# Patient Record
Sex: FEMALE | Race: WHITE | Employment: FULL TIME | URBAN - METROPOLITAN AREA
[De-identification: names, ages, dates, MRNs, and addresses within clinical notes are randomized per-mention and may not be internally consistent; named-entity substitution may affect disease eponyms.]

---

## 2024-10-07 ENCOUNTER — HOSPITAL ENCOUNTER (EMERGENCY)
Facility: CLINIC | Age: 24
Discharge: HOME | End: 2024-10-07
Attending: EMERGENCY MEDICINE
Payer: COMMERCIAL

## 2024-10-07 VITALS
HEIGHT: 67 IN | HEART RATE: 89 BPM | TEMPERATURE: 97.8 F | OXYGEN SATURATION: 97 % | WEIGHT: 200 LBS | RESPIRATION RATE: 18 BRPM | SYSTOLIC BLOOD PRESSURE: 139 MMHG | DIASTOLIC BLOOD PRESSURE: 91 MMHG | BODY MASS INDEX: 31.39 KG/M2

## 2024-10-07 DIAGNOSIS — L50.9 URTICARIA: Primary | ICD-10-CM

## 2024-10-07 PROCEDURE — 99283 EMERGENCY DEPT VISIT LOW MDM: CPT

## 2024-10-07 PROCEDURE — 6370000000 HC RX 637 (ALT 250 FOR IP): Performed by: NURSE PRACTITIONER

## 2024-10-07 RX ORDER — PREDNISONE 20 MG/1
TABLET ORAL
Qty: 15 TABLET | Refills: 0 | Status: SHIPPED | OUTPATIENT
Start: 2024-10-08 | End: 2024-10-18

## 2024-10-07 RX ORDER — PREDNISONE 20 MG/1
60 TABLET ORAL
Status: COMPLETED | OUTPATIENT
Start: 2024-10-07 | End: 2024-10-07

## 2024-10-07 RX ADMIN — PREDNISONE 60 MG: 20 TABLET ORAL at 13:22

## 2024-10-07 ASSESSMENT — ENCOUNTER SYMPTOMS
BACK PAIN: 0
VOMITING: 0
SHORTNESS OF BREATH: 0
COUGH: 0
SORE THROAT: 0
NAUSEA: 0
ABDOMINAL PAIN: 0
DIARRHEA: 0

## 2024-10-07 ASSESSMENT — PAIN - FUNCTIONAL ASSESSMENT: PAIN_FUNCTIONAL_ASSESSMENT: NONE - DENIES PAIN

## 2024-10-07 NOTE — DISCHARGE INSTRUCTIONS
Complete prednisone as prescribed.    Benadryl over-the-counter as directed to help with itching and hives.    Return to the ER: Fevers, facial swelling, chest pain, shortness of breath or breathing difficulty, drooling, vomiting, worsening rash; or any other concerning symptoms.

## 2024-10-07 NOTE — ED NOTES
Pt presents to ED c/o hives/itching that started this morning. Pt is unsure what is causing her symptoms and denies use of any new detergents, topical products, or medications. Hives appear to be present on trunk and extremities. Pt shows no signs of distress. Pt arrives A/Ox4, afebrile, PWD, PMS intact. Pt resting on stretcher with call light in reach.

## 2024-10-07 NOTE — ED PROVIDER NOTES
[facility]      Pt Name: Vickie Holliday  MRN: [id number]  Birthdate 2000  Date of evaluation: 10/7/2024    EMERGENCY DEPARTMENT ENCOUNTER           I reviewed the mid level provider's note and agree with the documented findings and we have discussed the plan of care. I have reviewed the emergency nurses triage note. I agree with the chief complaint, past medical history, past surgical history, allergies, medications, social and family history as documented unless otherwise noted below.       Walter Bishop, DO  10/07/24 1313    
cardiologist.        RADIOLOGY:   Non-plain film images such as CT, Ultrasound and MRI are read by the radiologist. Plain radiographic images are visualized and preliminarily interpreted by the emergency physician with the below findings:        Interpretation per the Radiologist below, if available at the time of this note:    No orders to display         ED BEDSIDE ULTRASOUND:   Performed by ED Physician - none    LABS:  Labs Reviewed - No data to display    All other labs were within normal range or not returned as of this dictation.    EMERGENCY DEPARTMENT COURSE and DIFFERENTIAL DIAGNOSIS/MDM:   Vitals:    Vitals:    10/07/24 1256   BP: (!) 139/91   Pulse: 89   Resp: 18   Temp: 97.8 °F (36.6 °C)   TempSrc: Oral   SpO2: 97%   Weight: 90.7 kg (200 lb)   Height: 1.702 m (5' 7\")           Medical Decision Making  This is a nontoxic-appearing 24-year-old female presenting via private auto with female , the patient reports that after taking Pepcid yesterday about 6 hours later she started developing hives, described as itchy, she woke up this morning and the symptoms were worse, generalized on her torso bilateral upper lower extremities; she reports she did restart using a soap that she has not used for a while but never had issues before using the soap otherwise there is been no new soaps lotions or laundry detergents no recent illnesses or known infectious disease exposures the patient has tried using topical Benadryl without significant improvement in symptoms prompting her to come to the emergency department for evaluation; she has no angioedema chest pain shortness of breath no nausea or vomiting.  LMP 3 weeks ago.    Patient is being started on prednisone first dose was given in the emergency department, tapering prednisone was sent to the patient's pharmacy of choice it was recommended that she uses oral Benadryl over-the-counter as directed to help with symptoms as well, calamine lotion to help with